# Patient Record
Sex: FEMALE | Race: BLACK OR AFRICAN AMERICAN | NOT HISPANIC OR LATINO | URBAN - METROPOLITAN AREA
[De-identification: names, ages, dates, MRNs, and addresses within clinical notes are randomized per-mention and may not be internally consistent; named-entity substitution may affect disease eponyms.]

---

## 2018-08-17 DIAGNOSIS — Z99.2 END STAGE RENAL FAILURE ON DIALYSIS: Primary | ICD-10-CM

## 2018-08-17 DIAGNOSIS — N18.6 END STAGE RENAL FAILURE ON DIALYSIS: Primary | ICD-10-CM

## 2018-08-20 ENCOUNTER — HOSPITAL ENCOUNTER (OUTPATIENT)
Facility: HOSPITAL | Age: 83
Discharge: HOME | End: 2018-08-20
Attending: SURGERY | Admitting: SURGERY
Payer: MEDICARE

## 2018-08-20 ENCOUNTER — ANESTHESIA EVENT (OUTPATIENT)
Dept: SURGERY | Facility: HOSPITAL | Age: 83
End: 2018-08-20
Payer: MEDICARE

## 2018-08-20 ENCOUNTER — HOSPITAL ENCOUNTER (OUTPATIENT)
Dept: VASCULAR SURGERY | Facility: CLINIC | Age: 83
Discharge: HOME | End: 2018-08-20
Attending: SURGERY
Payer: MEDICARE

## 2018-08-20 ENCOUNTER — ANESTHESIA (OUTPATIENT)
Dept: SURGERY | Facility: HOSPITAL | Age: 83
End: 2018-08-20
Payer: MEDICARE

## 2018-08-20 ENCOUNTER — OFFICE VISIT (OUTPATIENT)
Dept: VASCULAR SURGERY | Facility: CLINIC | Age: 83
End: 2018-08-20
Payer: MEDICARE

## 2018-08-20 VITALS
TEMPERATURE: 98 F | BODY MASS INDEX: 29.99 KG/M2 | WEIGHT: 180 LBS | OXYGEN SATURATION: 99 % | RESPIRATION RATE: 16 BRPM | DIASTOLIC BLOOD PRESSURE: 55 MMHG | SYSTOLIC BLOOD PRESSURE: 168 MMHG | HEART RATE: 78 BPM | HEIGHT: 65 IN

## 2018-08-20 VITALS — HEART RATE: 71 BPM | DIASTOLIC BLOOD PRESSURE: 56 MMHG | TEMPERATURE: 98 F | SYSTOLIC BLOOD PRESSURE: 165 MMHG

## 2018-08-20 DIAGNOSIS — N18.6 ESRD (END STAGE RENAL DISEASE) ON DIALYSIS: Primary | ICD-10-CM

## 2018-08-20 DIAGNOSIS — T82.510A PSEUDOANEURYSM OF ARTERIOVENOUS DIALYSIS FISTULA, INITIAL ENCOUNTER: ICD-10-CM

## 2018-08-20 DIAGNOSIS — N18.6 ESRD (END STAGE RENAL DISEASE) ON DIALYSIS: ICD-10-CM

## 2018-08-20 DIAGNOSIS — Z99.2 ESRD (END STAGE RENAL DISEASE) ON DIALYSIS: Primary | ICD-10-CM

## 2018-08-20 DIAGNOSIS — N18.6 END STAGE RENAL FAILURE ON DIALYSIS: ICD-10-CM

## 2018-08-20 DIAGNOSIS — Z99.2 END STAGE RENAL FAILURE ON DIALYSIS: ICD-10-CM

## 2018-08-20 DIAGNOSIS — Z01.818 PRE-OP EVALUATION: Primary | ICD-10-CM

## 2018-08-20 DIAGNOSIS — T82.510A PSEUDOANEURYSM OF ARTERIOVENOUS DIALYSIS FISTULA, INITIAL ENCOUNTER: Primary | ICD-10-CM

## 2018-08-20 DIAGNOSIS — Z99.2 ESRD (END STAGE RENAL DISEASE) ON DIALYSIS: ICD-10-CM

## 2018-08-20 DIAGNOSIS — R07.9 CHEST PAIN: ICD-10-CM

## 2018-08-20 LAB
ANION GAP SERPL CALC-SCNC: 13 MMOL/L
BUN SERPL-MCNC: 39 MG/DL
CALCIUM SERPL-MCNC: 8.2 MG/DL
CHLORIDE SERPL-SCNC: 111 MMOL/L
CO2 SERPL-SCNC: 18 MMOL/L
CREAT SERPL-MCNC: 7.3 MG/DL
EST. GFR  (AFRICAN AMERICAN): 5.1 ML/MIN/1.73 M^2
EST. GFR  (NON AFRICAN AMERICAN): 4.5 ML/MIN/1.73 M^2
GLUCOSE SERPL-MCNC: 77 MG/DL
POCT GLUCOSE: 83 MG/DL (ref 70–110)
POTASSIUM SERPL-SCNC: 5 MMOL/L
SODIUM SERPL-SCNC: 142 MMOL/L

## 2018-08-20 PROCEDURE — 82962 GLUCOSE BLOOD TEST: CPT | Performed by: SURGERY

## 2018-08-20 PROCEDURE — 25000003 PHARM REV CODE 250: Performed by: STUDENT IN AN ORGANIZED HEALTH CARE EDUCATION/TRAINING PROGRAM

## 2018-08-20 PROCEDURE — 93005 ELECTROCARDIOGRAM TRACING: CPT

## 2018-08-20 PROCEDURE — 99205 OFFICE O/P NEW HI 60 MIN: CPT | Mod: 57,S$PBB,, | Performed by: SURGERY

## 2018-08-20 PROCEDURE — 63600175 PHARM REV CODE 636 W HCPCS: Performed by: SURGERY

## 2018-08-20 PROCEDURE — 25000003 PHARM REV CODE 250: Performed by: NURSE ANESTHETIST, CERTIFIED REGISTERED

## 2018-08-20 PROCEDURE — 93990 DOPPLER FLOW TESTING: CPT | Mod: 26,GW,S$PBB, | Performed by: SURGERY

## 2018-08-20 PROCEDURE — 25000003 PHARM REV CODE 250: Performed by: SURGERY

## 2018-08-20 PROCEDURE — 77001 FLUOROGUIDE FOR VEIN DEVICE: CPT | Mod: 26,GW,GC, | Performed by: SURGERY

## 2018-08-20 PROCEDURE — 36558 INSERT TUNNELED CV CATH: CPT | Mod: 51,GW,RT,GC | Performed by: SURGERY

## 2018-08-20 PROCEDURE — 93990 DOPPLER FLOW TESTING: CPT | Mod: PBBFAC | Performed by: SURGERY

## 2018-08-20 PROCEDURE — 36000706: Performed by: SURGERY

## 2018-08-20 PROCEDURE — 80048 BASIC METABOLIC PNL TOTAL CA: CPT

## 2018-08-20 PROCEDURE — 99999 PR PBB SHADOW E&M-EST. PATIENT-LVL III: CPT | Mod: PBBFAC,,, | Performed by: SURGERY

## 2018-08-20 PROCEDURE — 99213 OFFICE O/P EST LOW 20 MIN: CPT | Mod: PBBFAC,25 | Performed by: SURGERY

## 2018-08-20 PROCEDURE — 93010 ELECTROCARDIOGRAM REPORT: CPT | Mod: GW,,, | Performed by: INTERNAL MEDICINE

## 2018-08-20 PROCEDURE — D9220A PRA ANESTHESIA: Mod: GW,CRNA,, | Performed by: NURSE ANESTHETIST, CERTIFIED REGISTERED

## 2018-08-20 PROCEDURE — C1894 INTRO/SHEATH, NON-LASER: HCPCS | Performed by: SURGERY

## 2018-08-20 PROCEDURE — D9220A PRA ANESTHESIA: Mod: GW,ANES,, | Performed by: ANESTHESIOLOGY

## 2018-08-20 PROCEDURE — 37607 LIG/BANDING ANGIOACS AV FSTL: CPT | Mod: GW,GC,, | Performed by: SURGERY

## 2018-08-20 PROCEDURE — 37000008 HC ANESTHESIA 1ST 15 MINUTES: Performed by: SURGERY

## 2018-08-20 PROCEDURE — 63600175 PHARM REV CODE 636 W HCPCS: Performed by: NURSE ANESTHETIST, CERTIFIED REGISTERED

## 2018-08-20 PROCEDURE — 71000015 HC POSTOP RECOV 1ST HR: Performed by: SURGERY

## 2018-08-20 PROCEDURE — 37000009 HC ANESTHESIA EA ADD 15 MINS: Performed by: SURGERY

## 2018-08-20 PROCEDURE — 71000016 HC POSTOP RECOV ADDL HR: Performed by: SURGERY

## 2018-08-20 PROCEDURE — 36000707: Performed by: SURGERY

## 2018-08-20 RX ORDER — DIPHENHYDRAMINE HYDROCHLORIDE 50 MG/ML
INJECTION INTRAMUSCULAR; INTRAVENOUS
Status: DISCONTINUED | OUTPATIENT
Start: 2018-08-20 | End: 2018-08-20

## 2018-08-20 RX ORDER — HYDROMORPHONE HYDROCHLORIDE 1 MG/ML
0.2 INJECTION, SOLUTION INTRAMUSCULAR; INTRAVENOUS; SUBCUTANEOUS EVERY 5 MIN PRN
Status: DISCONTINUED | OUTPATIENT
Start: 2018-08-20 | End: 2018-08-20 | Stop reason: HOSPADM

## 2018-08-20 RX ORDER — LIDOCAINE HYDROCHLORIDE 10 MG/ML
INJECTION INFILTRATION; PERINEURAL
Status: DISCONTINUED | OUTPATIENT
Start: 2018-08-20 | End: 2018-08-20 | Stop reason: HOSPADM

## 2018-08-20 RX ORDER — FENTANYL CITRATE 50 UG/ML
INJECTION, SOLUTION INTRAMUSCULAR; INTRAVENOUS
Status: DISCONTINUED | OUTPATIENT
Start: 2018-08-20 | End: 2018-08-20

## 2018-08-20 RX ORDER — LIDOCAINE HYDROCHLORIDE 10 MG/ML
1 INJECTION, SOLUTION EPIDURAL; INFILTRATION; INTRACAUDAL; PERINEURAL ONCE
Status: CANCELLED | OUTPATIENT
Start: 2018-08-20 | End: 2018-08-20

## 2018-08-20 RX ORDER — PROPOFOL 10 MG/ML
VIAL (ML) INTRAVENOUS CONTINUOUS PRN
Status: DISCONTINUED | OUTPATIENT
Start: 2018-08-20 | End: 2018-08-20

## 2018-08-20 RX ORDER — MEMANTINE HYDROCHLORIDE 28 MG/1
28 CAPSULE, EXTENDED RELEASE ORAL
COMMUNITY
Start: 2018-07-11

## 2018-08-20 RX ORDER — SODIUM CHLORIDE 9 MG/ML
INJECTION, SOLUTION INTRAVENOUS CONTINUOUS PRN
Status: DISCONTINUED | OUTPATIENT
Start: 2018-08-20 | End: 2018-08-20

## 2018-08-20 RX ORDER — MUPIROCIN 20 MG/G
OINTMENT TOPICAL
Status: CANCELLED | OUTPATIENT
Start: 2018-08-20

## 2018-08-20 RX ORDER — CLOPIDOGREL BISULFATE 75 MG/1
75 TABLET ORAL DAILY
COMMUNITY

## 2018-08-20 RX ORDER — ONDANSETRON 2 MG/ML
INJECTION INTRAMUSCULAR; INTRAVENOUS
Status: DISCONTINUED | OUTPATIENT
Start: 2018-08-20 | End: 2018-08-20

## 2018-08-20 RX ORDER — SEVELAMER CARBONATE 800 MG/1
800 TABLET, FILM COATED ORAL
COMMUNITY

## 2018-08-20 RX ORDER — HEPARIN SODIUM 1000 [USP'U]/ML
INJECTION, SOLUTION INTRAVENOUS; SUBCUTANEOUS
Status: DISCONTINUED | OUTPATIENT
Start: 2018-08-20 | End: 2018-08-20 | Stop reason: HOSPADM

## 2018-08-20 RX ORDER — LIDOCAINE HYDROCHLORIDE 10 MG/ML
1 INJECTION, SOLUTION EPIDURAL; INFILTRATION; INTRACAUDAL; PERINEURAL ONCE
Status: COMPLETED | OUTPATIENT
Start: 2018-08-20 | End: 2018-08-20

## 2018-08-20 RX ORDER — PROPOFOL 10 MG/ML
VIAL (ML) INTRAVENOUS
Status: DISCONTINUED | OUTPATIENT
Start: 2018-08-20 | End: 2018-08-20

## 2018-08-20 RX ORDER — HYDRALAZINE HYDROCHLORIDE 50 MG/1
50 TABLET, FILM COATED ORAL
COMMUNITY

## 2018-08-20 RX ORDER — ASPIRIN 81 MG/1
81 TABLET ORAL DAILY
COMMUNITY

## 2018-08-20 RX ORDER — QUETIAPINE FUMARATE 50 MG/1
50 TABLET, FILM COATED ORAL
COMMUNITY

## 2018-08-20 RX ORDER — LOSARTAN POTASSIUM 100 MG/1
TABLET ORAL
COMMUNITY
Start: 2018-06-22

## 2018-08-20 RX ORDER — DONEPEZIL HYDROCHLORIDE 10 MG/1
10 TABLET, FILM COATED ORAL
COMMUNITY

## 2018-08-20 RX ORDER — TRAZODONE HYDROCHLORIDE 100 MG/1
100 TABLET ORAL
COMMUNITY
Start: 2018-07-11

## 2018-08-20 RX ORDER — SODIUM CHLORIDE 0.9 % (FLUSH) 0.9 %
3 SYRINGE (ML) INJECTION
Status: DISCONTINUED | OUTPATIENT
Start: 2018-08-20 | End: 2018-08-20 | Stop reason: HOSPADM

## 2018-08-20 RX ORDER — PANTOPRAZOLE SODIUM 40 MG/1
40 TABLET, DELAYED RELEASE ORAL
COMMUNITY
Start: 2017-04-11

## 2018-08-20 RX ORDER — MIDAZOLAM HYDROCHLORIDE 1 MG/ML
INJECTION, SOLUTION INTRAMUSCULAR; INTRAVENOUS
Status: DISCONTINUED | OUTPATIENT
Start: 2018-08-20 | End: 2018-08-20

## 2018-08-20 RX ORDER — LIDOCAINE HCL/PF 100 MG/5ML
SYRINGE (ML) INTRAVENOUS
Status: DISCONTINUED | OUTPATIENT
Start: 2018-08-20 | End: 2018-08-20

## 2018-08-20 RX ORDER — HYDROCODONE BITARTRATE AND ACETAMINOPHEN 5; 325 MG/1; MG/1
1 TABLET ORAL EVERY 6 HOURS PRN
Qty: 10 TABLET | Refills: 0 | Status: SHIPPED | OUTPATIENT
Start: 2018-08-20

## 2018-08-20 RX ORDER — MUPIROCIN 20 MG/G
OINTMENT TOPICAL
Status: DISCONTINUED | OUTPATIENT
Start: 2018-08-20 | End: 2018-08-20 | Stop reason: HOSPADM

## 2018-08-20 RX ORDER — HYDROCODONE BITARTRATE AND ACETAMINOPHEN 5; 325 MG/1; MG/1
1 TABLET ORAL ONCE
Status: COMPLETED | OUTPATIENT
Start: 2018-08-20 | End: 2018-08-20

## 2018-08-20 RX ADMIN — DIPHENHYDRAMINE HYDROCHLORIDE 10 MG: 50 INJECTION, SOLUTION INTRAMUSCULAR; INTRAVENOUS at 03:08

## 2018-08-20 RX ADMIN — HYDROCODONE BITARTRATE AND ACETAMINOPHEN 1 TABLET: 5; 325 TABLET ORAL at 07:08

## 2018-08-20 RX ADMIN — MUPIROCIN: 20 OINTMENT TOPICAL at 02:08

## 2018-08-20 RX ADMIN — LIDOCAINE HYDROCHLORIDE 10 MG: 10 INJECTION, SOLUTION EPIDURAL; INFILTRATION; INTRACAUDAL; PERINEURAL at 01:08

## 2018-08-20 RX ADMIN — ONDANSETRON 4 MG: 2 INJECTION INTRAMUSCULAR; INTRAVENOUS at 03:08

## 2018-08-20 RX ADMIN — FENTANYL CITRATE 25 MCG: 50 INJECTION, SOLUTION INTRAMUSCULAR; INTRAVENOUS at 03:08

## 2018-08-20 RX ADMIN — MIDAZOLAM HYDROCHLORIDE 0.5 MG: 1 INJECTION, SOLUTION INTRAMUSCULAR; INTRAVENOUS at 04:08

## 2018-08-20 RX ADMIN — MIDAZOLAM HYDROCHLORIDE 1 MG: 1 INJECTION, SOLUTION INTRAMUSCULAR; INTRAVENOUS at 03:08

## 2018-08-20 RX ADMIN — PROPOFOL 20 MG: 10 INJECTION, EMULSION INTRAVENOUS at 03:08

## 2018-08-20 RX ADMIN — LIDOCAINE HYDROCHLORIDE 50 MG: 20 INJECTION, SOLUTION INTRAVENOUS at 03:08

## 2018-08-20 RX ADMIN — PROPOFOL 20 MCG/KG/MIN: 10 INJECTION, EMULSION INTRAVENOUS at 03:08

## 2018-08-20 RX ADMIN — DEXTROSE 2 G: 50 INJECTION, SOLUTION INTRAVENOUS at 03:08

## 2018-08-20 RX ADMIN — SODIUM CHLORIDE: 0.9 INJECTION, SOLUTION INTRAVENOUS at 02:08

## 2018-08-20 NOTE — INTERVAL H&P NOTE
The patient has been examined and the H&P has been reviewed:    I concur with the findings and no changes have occurred since H&P was written.    Anesthesia/Surgery risks, benefits and alternative options discussed and understood by patient/family.          Active Hospital Problems    Diagnosis  POA    ESRD (end stage renal disease) on dialysis [N18.6, Z99.2]  Not Applicable      Resolved Hospital Problems   No resolved problems to display.

## 2018-08-20 NOTE — OP NOTE
DATE: 8/20/2018    PREOPERATIVE DIAGNOSIS:  1. Renal failure on Dialysis       2. Bleeding, Ulcerated Pseudoaneurysm Right arm AV fistula    POSTOPERATIVE DIAGNOSIS:   Same    PROCEDURE PERFORMED:   1)Right internal jugular tunneled hemodialysis catheter placement with fluoro, 19cm, 14.5Fr Hemosplit  2)Right upper extremity AV fistula ligation    ATTENDING SURGEON: Odilia Costa M.D.     HOUSESTAFF SURGEON: Aravind Borjas M.D. (RES)     ANESTHESIA: Monitored anesthesia care plus local.     ESTIMATED BLOOD LOSS: 10 mL     FINDINGS: A 14.5-Citizen of [place] Permacath placed with tip at junction of superior vena cava and right atrium.     SPECIMEN: None.     DRAINS: None.     COMPLICATIONS: None.     INDICATIONS: Patient is a 89 y/o female with ESRD on HD  Who presented to [facility] with an ulcerated, bleeding right upper extremity AV fistula.  We recommended that this AV fistula be ligated and a right internal jugular tunneled dialysis catheter be placed for dialysis access. The patient did sign informed consent and expressed understanding of the risks and benefits of surgery.     OPERATIVE PROCEDURE: The patient was identified in the pre op area and transported to the Operating Room. She was placed supine on the operating table and padded appropriately. Monitors were applied. Monitored anesthesia care was initiated. The right neck and chest as well as the right arm were prepped and draped in the standard sterile surgical fashion. A timeout was performed and all team members present agreed this was the correct procedure on the correct patient. We also confirmed administration of appropriate preoperative antibiotics.     Using ultrasound we visualized the right internal jugular vein. After administering local anesthesia, the vein was cannulated with a hollow bore needle. A guidewire was placed and confirmed to be in correct position using fluoroscopy. A small skin incision was made around the guidewire. An  appropriately sized catheter was chosen. A counter incision just caudal to the right clavicle was made after administering additional lidocaine. The catheter was loaded onto the tunneling device and tunneled from the chest incision to the neck incision. Under fluoroscopic guidance, the split sheath and dilator were placed over the guidewire, and the guidewire and dilator were then removed. The catheter was placed down the split sheath and the sheath was split and peeled away. Fluoroscopy confirmed appropriate position of the catheter, which aspirated and flushed easily. Heparinized saline was instilled in the catheter. The catheter was secured at it's exit site using 2-0 Prolene suture. The skin incision on the neck was closed with subcuticular 4-0 Monocryl. A sterile dressing was applied.    We then turned our attention to the right upper extremity AV fistula. A 2 cm incision was made over the previous scar. The incision was carried down through the subcutaneous tissues using Bovie electrocautery and the fistula was isolated and divided using two 0 silk sutures. Hemostasis was achieved and the wound was closed in layers using interrupted 3-0 Vicryl deep dermal sutures and a running 4-0 Monocryl subcuticular stitch. Steri Strips and a sterile dressing were applied.     The patient was transported to recovery in stable condition. All sponge, instrument and needle counts were correct at the end of the procedure. Dr. Costa was present and scrubbed for the entire case.    Aravind Borjas MD  General Surgery PGY-3  Pager: [phone]

## 2018-08-20 NOTE — PROGRESS NOTES
Patient ID: Anjana Mccauley is a 89 y.o. female.    I. HISTORY     Chief Complaint: Consult      HPI   MsByron Mccauley is a 89 yo female who presents today for a second opinion regarding her fistula. Patient and her family note she has been on HD since 2011 and has had two upper extremity fistulas created. One fistula in the left arm which occluded, the second and most recent in her right arm. Patient's family notes that the fistula has been functioning well without problems with access/outflow, but over the past few weeks they have noticed increased bleeding following access and in between access. They note that the site will ooze blood such that it will soak the dressings. She has been into the ED several times, most recently requiring stitch placement to stop the bleeding. Her family notes that while she was last in the ED she required a blood transfusion but was never hemodynamically unstable. She feels that her fistula has become painful since it has begun to bleed. Upon exam patient has an ulceration to the area of fistula access; 1 x 1.5 cm in diameter; patient and family note it has been present since about May but has gotten worse over time. She generally receives medical treatment for her fistula in [place], but when they presented to the facility for evaluation they suggested her only option would be to ligate the fistula. They therefore present today for a second opinion before proceeding forward.     Review of Systems   Constitution: Positive for weakness. Negative for chills and fever.        Patient generally deconditioned following injury this spring; broke both femurs   Cardiovascular: Negative for chest pain, irregular heartbeat and palpitations.        Fistula present to right upper extremity; palpable thrill present.   Respiratory: Negative for cough and shortness of breath.    Skin:        Ulceration present over most superior access site of fistula on right arm, 1 x 1.5 cm in diameter. Oozing  serosanguinous fluid.        II. PHYSICAL EXAM   Left Leg BP: 165/56 (08/20/18 0817)  Pulse: 71  Temp: 98 °F (36.7 °C)  There is no height or weight on file to calculate BMI.      Physical Exam   Constitutional: She is oriented to person, place, and time. She appears well-developed and well-nourished. No distress.   HENT:   Head: Normocephalic and atraumatic.   Eyes: Conjunctivae are normal. Pupils are equal, round, and reactive to light.   Neck: Normal range of motion. Neck supple.   Cardiovascular: Normal rate and regular rhythm.   Pulmonary/Chest: Effort normal. No respiratory distress.   Abdominal: Soft. There is no tenderness.   Musculoskeletal: Normal range of motion. She exhibits no edema.   Fistula present with palpable thrill to right upper extremity. Ulceration to superior access site of fistula; 1 x 1.5 cm in diameter. Ulceration oozing a serosanguinous fluid.    Neurological: She is alert and oriented to person, place, and time.   Skin: Skin is warm. No erythema.   Psychiatric: She has a normal mood and affect. Her behavior is normal. Judgment normal.       III. ASSESSMENT & PLAN (MEDICAL DECISION MAKING)     1. Pseudoaneurysm of arteriovenous dialysis fistula, initial encounter    2. ESRD (end stage renal disease) on dialysis        Imaging Results:  Hd duplex - no stenosis, flow vol 2373    Assessment/Diagnosis and Plan:  Anjana Mccauley is a 89 y.o. female with ESRD, on HD via a left BC AVF    Vein has a 1cm ulceration that has bled multiple times over the past week even requiring a blood transfusion.  She needs her AVF ligated and a catheter placed. Recommend that be done today with discharge home and HD at her home unit tomorrow.    I have explained the risks, benefits, medical treatments, and alternatives of the procedure in detail.  The patient voices understanding and all questions have been answered.  The patient agrees to proceed as planned.  >60 minute visit with more than half in  counseling    Odilia Costa MD FACS [facility]  Vascular & Endovascular Surgery

## 2018-08-20 NOTE — TRANSFER OF CARE
"Anesthesia Transfer of Care Note    Patient: Anjana Mccauley    Procedure(s) Performed: Procedure(s) (LRB):  LIGATION, AV FISTULA Placement HD Catheter with Fluoro (Right)    Patient location: PACU    Anesthesia Type: general    Transport from OR: Transported from OR on room air with adequate spontaneous ventilation    Post pain: adequate analgesia    Post assessment: no apparent anesthetic complications and tolerated procedure well    Post vital signs: stable    Level of consciousness: confused    Nausea/Vomiting: no nausea/vomiting    Complications: none    Transfer of care protocol was followed      Last vitals:   Visit Vitals  BP (!) 146/46 (BP Location: Right leg, Patient Position: Lying)   Pulse 70   Temp 37 °C (98.6 °F) (Oral)   Resp 18   Ht 5' 5" (1.651 m)   Wt 81.6 kg (180 lb)   SpO2 100%   Breastfeeding? No   BMI 29.95 kg/m²     "

## 2018-08-20 NOTE — BRIEF OP NOTE
[facility]  Brief Operative Note     SUMMARY     Surgery Date: 8/20/2018     Surgeon(s) and Role:     * Odilia Costa MD - Primary     * Aravind Borjas MD - Resident - Assisting      Pre-op Diagnosis:  ESRD (end stage renal disease) on dialysis [N18.6, Z99.2]  Pseudoaneurysm of arteriovenous dialysis fistula, initial encounter [T82.494N]    Post-op Diagnosis:  Post-Op Diagnosis Codes:     * ESRD (end stage renal disease) on dialysis [N18.6, Z99.2]     * Pseudoaneurysm of arteriovenous dialysis fistula, initial encounter [T82.156G]    Procedure(s) (LRB):  LIGATION, AV FISTULA Placement HD Catheter with Fluoro (Right)    Anesthesia: Local MAC    Description of the findings of the procedure: Left AV fistula ligation; Right internal jugular Permacath placement.     Findings/Key Components: Left AV fistula ligation; Right internal jugular Permacath placement.       Estimated Blood Loss: Minimal          Specimens:   Specimen (12h ago, onward)    None          Discharge Note    SUMMARY     Admit Date: 8/20/2018    Discharge Date and Time:  08/20/2018 4:49 PM    Hospital Course (synopsis of major diagnoses, care, treatment, and services provided during the course of the hospital stay): The patient presented for the above outpatient procedure, which she tolerated without immediate issue. She was taken to recovery postoperatively and deemed safe for discharge home the same day.      Final Diagnosis: Post-Op Diagnosis Codes:     * ESRD (end stage renal disease) on dialysis [N18.6, Z99.2]     * Pseudoaneurysm of arteriovenous dialysis fistula, initial encounter [T82.406I]    Disposition: Home or Self Care    Follow Up/Patient Instructions:     Medications:  Reconciled Home Medications:      Medication List      START taking these medications    HYDROcodone-acetaminophen 5-325 mg per tablet  Commonly known as:  NORCO  Take 1 tablet by mouth every 6 (six) hours as needed for Pain.        CONTINUE  taking these medications    aspirin 81 MG EC tablet  Commonly known as:  ECOTRIN  Take 81 mg by mouth once daily.     clopidogrel 75 mg tablet  Commonly known as:  PLAVIX  Take 75 mg by mouth once daily.     donepezil 10 MG tablet  Commonly known as:  ARICEPT  Take 10 mg by mouth.     hydrALAZINE 50 MG tablet  Commonly known as:  APRESOLINE  Take 50 mg by mouth.     losartan 100 MG tablet  Commonly known as:  COZAAR     memantine 28 mg Cspx  Take 28 mg by mouth.     pantoprazole 40 MG tablet  Commonly known as:  PROTONIX  Take 40 mg by mouth.     QUEtiapine 50 MG tablet  Commonly known as:  SEROQUEL  Take 50 mg by mouth.     sevelamer carbonate 800 mg Tab  Commonly known as:  RENVELA  Take 800 mg by mouth.     traZODone 100 MG tablet  Commonly known as:  DESYREL  Take 100 mg by mouth.          Discharge Procedure Orders   Notify your health care provider if you experience any of the following:  temperature >100.4     Notify your health care provider if you experience any of the following:  persistent nausea and vomiting or diarrhea     Notify your health care provider if you experience any of the following:  severe uncontrolled pain     Notify your health care provider if you experience any of the following:  redness, tenderness, or signs of infection (pain, swelling, redness, odor or green/yellow discharge around incision site)     Remove dressing in 48 hours   Scheduling Instructions: May remove outer dressing on arm in 48 hours. Underlying Steri Strips will remain in place for 7-10 days and fall off on their own. Leave dressing on left chest dialysis catheter. The dialysis center will change dressing appropriately.     Activity as tolerated

## 2018-08-20 NOTE — H&P (VIEW-ONLY)
Patient ID: Anjana Mccauley is a 89 y.o. female.    I. HISTORY     Chief Complaint: Consult      HPI   MsByron Mccauley is a 89 yo female who presents today for a second opinion regarding her fistula. Patient and her family note she has been on HD since 2011 and has had two upper extremity fistulas created. One fistula in the left arm which occluded, the second and most recent in her right arm. Patient's family notes that the fistula has been functioning well without problems with access/outflow, but over the past few weeks they have noticed increased bleeding following access and in between access. They note that the site will ooze blood such that it will soak the dressings. She has been into the ED several times, most recently requiring stitch placement to stop the bleeding. Her family notes that while she was last in the ED she required a blood transfusion but was never hemodynamically unstable. She feels that her fistula has become painful since it has begun to bleed. Upon exam patient has an ulceration to the area of fistula access; 1 x 1.5 cm in diameter; patient and family note it has been present since about May but has gotten worse over time. She generally receives medical treatment for her fistula in [place], but when they presented to the facility for evaluation they suggested her only option would be to ligate the fistula. They therefore present today for a second opinion before proceeding forward.     Review of Systems   Constitution: Positive for weakness. Negative for chills and fever.        Patient generally deconditioned following injury this spring; broke both femurs   Cardiovascular: Negative for chest pain, irregular heartbeat and palpitations.        Fistula present to right upper extremity; palpable thrill present.   Respiratory: Negative for cough and shortness of breath.    Skin:        Ulceration present over most superior access site of fistula on right arm, 1 x 1.5 cm in diameter. Oozing  serosanguinous fluid.        II. PHYSICAL EXAM   Left Leg BP: 165/56 (08/20/18 0817)  Pulse: 71  Temp: 98 °F (36.7 °C)  There is no height or weight on file to calculate BMI.      Physical Exam   Constitutional: She is oriented to person, place, and time. She appears well-developed and well-nourished. No distress.   HENT:   Head: Normocephalic and atraumatic.   Eyes: Conjunctivae are normal. Pupils are equal, round, and reactive to light.   Neck: Normal range of motion. Neck supple.   Cardiovascular: Normal rate and regular rhythm.   Pulmonary/Chest: Effort normal. No respiratory distress.   Abdominal: Soft. There is no tenderness.   Musculoskeletal: Normal range of motion. She exhibits no edema.   Fistula present with palpable thrill to right upper extremity. Ulceration to superior access site of fistula; 1 x 1.5 cm in diameter. Ulceration oozing a serosanguinous fluid.    Neurological: She is alert and oriented to person, place, and time.   Skin: Skin is warm. No erythema.   Psychiatric: She has a normal mood and affect. Her behavior is normal. Judgment normal.       III. ASSESSMENT & PLAN (MEDICAL DECISION MAKING)     1. Pseudoaneurysm of arteriovenous dialysis fistula, initial encounter    2. ESRD (end stage renal disease) on dialysis        Imaging Results:  Hd duplex - no stenosis, flow vol 2373    Assessment/Diagnosis and Plan:  Anjana Mccauley is a 89 y.o. female with ESRD, on HD via a left BC AVF    Vein has a 1cm ulceration that has bled multiple times over the past week even requiring a blood transfusion.  She needs her AVF ligated and a catheter placed. Recommend that be done today with discharge home and HD at her home unit tomorrow.    I have explained the risks, benefits, medical treatments, and alternatives of the procedure in detail.  The patient voices understanding and all questions have been answered.  The patient agrees to proceed as planned.  >60 minute visit with more than half in  counseling    Odilia Costa MD FACS [facility]  Vascular & Endovascular Surgery

## 2018-08-20 NOTE — PROGRESS NOTES
-fistula present for at least 5 years; bacilobrachial transpotisiont in 2015, prior fisulta a few years before...Also, has had stent placed downstream of anastomoses  -spontaneous bleeding noticed after dialysis over past few weeks; had some oozing around site following an infection about a year ago  -with bledding will soak through dressing; has required 3 visits to ED...required blood transfusions (has never been HD unstable due to bleeding, but lost enough to require blood transfusion)  -bleeding usually resolves before she can get to ED  -stictch placed on Sunday, stitch placed oin FEbruray as well  -[facility] for dialysis; suggested fistula would need to be ligated to stop bleeding, want second opinon  -have been able to access fistula well, no outflow problems  -been complaining of pain with bleeding    Physical exam;

## 2018-08-20 NOTE — ANESTHESIA PREPROCEDURE EVALUATION
08/20/2018  Anjana Mccauley is a 89 y.o., female.    Anesthesia Evaluation    I have reviewed the Patient Summary Reports.    I have reviewed the Nursing Notes.   I have reviewed the Medications.     Review of Systems  Anesthesia Hx:  No problems with previous Anesthesia  History of prior surgery of interest to airway management or planning: Previous anesthesia: General Denies Family Hx of Anesthesia complications.   Denies Personal Hx of Anesthesia complications.   Cardiovascular:   Exercise tolerance: poor Hypertension CHF    Renal/:   Chronic Renal Disease, ESRD, Dialysis    Neurological:   CVA, residual symptoms    Endocrine:   Diabetes    Psych:   Psychiatric History          Physical Exam  General:  Well nourished    Airway/Jaw/Neck:  Airway Findings: Mouth Opening: Normal Tongue: Normal  General Airway Assessment: Adult  Mallampati: II  Improves to II with phonation.  TM Distance: Normal, at least 6 cm  Jaw/Neck Findings:  Neck ROM: Normal ROM      Dental:  Dental Findings: In tact   Chest/Lungs:  Chest/Lungs Findings: Clear to auscultation, Normal Respiratory Rate     Heart/Vascular:  Heart Findings: Rate: Normal  Rhythm: Regular Rhythm  Sounds: Normal        Mental Status:  Mental Status Findings:  Cooperative         Anesthesia Plan  Type of Anesthesia, risks & benefits discussed:  Anesthesia Type:  general, MAC  Patient's Preference:   Intra-op Monitoring Plan: standard ASA monitors  Intra-op Monitoring Plan Comments:   Post Op Pain Control Plan: multimodal analgesia  Post Op Pain Control Plan Comments:   Induction:   IV  Beta Blocker:         Informed Consent: Patient understands risks and agrees with Anesthesia plan.  Questions answered. Anesthesia consent signed with patient representative.  ASA Score: 4     Day of Surgery Review of History & Physical:    H&P update referred to the surgeon.          Ready For Surgery From Anesthesia Perspective.

## 2018-08-21 NOTE — PROGRESS NOTES
Dr. Murillo here to hold pressure to steri-strip to neck. Bleeding stopped after about 20 minutes of pressure and new steri-strip applied.

## 2018-08-21 NOTE — PLAN OF CARE
Problem: Patient Care Overview  Goal: Plan of Care Review  Outcome: Outcome(s) achieved Date Met: 08/20/18  Awake and alert. VSS. Denies  Nausea. Appears comfortable. CXR reviewed by MD and states OK for discharge. Vascular has reviewed BMP and EKG and states that patient is ready for discharge. Tolerating liquids well. DC instructions given to patient and family and they verbalize understanding.

## 2018-08-24 NOTE — ANESTHESIA POSTPROCEDURE EVALUATION
"Anesthesia Post Evaluation    Patient: Anjana Mccauley    Procedure(s) Performed: Procedure(s) (LRB):  LIGATION, AV FISTULA Placement HD Catheter with Fluoro (Right)    Final Anesthesia Type: general  [facility]  Patient participation: Yes- Able to Participate  Level of consciousness: awake and alert  Post-procedure vital signs: reviewed and stable  Pain management: adequate  Airway patency: patent  PONV status at discharge: No PONV  Anesthetic complications: no      Cardiovascular status: blood pressure returned to baseline  Respiratory status: unassisted  Hydration status: euvolemic  Follow-up not needed.        Visit Vitals  BP (!) 168/55   Pulse 78   Temp 36.7 °C (98.1 °F) (Temporal)   Resp 16   Ht 5' 5" (1.651 m)   Wt 81.6 kg (180 lb)   SpO2 99%   Breastfeeding? No   BMI 29.95 kg/m²       Pain/Elizabeth Score: No Data Recorded      "
Single

## 2018-09-25 ENCOUNTER — TELEPHONE (OUTPATIENT)
Dept: VASCULAR SURGERY | Facility: CLINIC | Age: 83
End: 2018-09-25

## 2018-09-25 DIAGNOSIS — N18.6 ESRD (END STAGE RENAL DISEASE) ON DIALYSIS: Primary | ICD-10-CM

## 2018-09-25 DIAGNOSIS — Z99.2 ESRD (END STAGE RENAL DISEASE) ON DIALYSIS: Primary | ICD-10-CM

## 2018-09-25 NOTE — TELEPHONE ENCOUNTER
----- Message from Tati Hercules sent at 9/25/2018 10:06 AM CDT -----  Contact: Ms Deluca/   [facility]   [phone]  Ms Deluca states patient still experiencing problem with arm need appointment for either Tuesday or Thursday next week.   Please call  Ms Avendaño patient daughter at [phone] if no answer.   Please call  Ms VivarSandrine/   [facility]   [phone]

## (undated) DEVICE — DRESSING TELFA STRL 4X3 LF

## (undated) DEVICE — TRAY MINOR GEN SURG

## (undated) DEVICE — SUT MCRYL PLUS 4-0 PS2 27IN

## (undated) DEVICE — COVER LIGHT HANDLE 80/CA

## (undated) DEVICE — GOWN SURGICAL X-LARGE

## (undated) DEVICE — KIT INTRO MICRO NIT VSI 4FR

## (undated) DEVICE — SUT 4-0 12-18IN SILK BLACK

## (undated) DEVICE — SUT MONOCRYL 3-0 SH U/D

## (undated) DEVICE — APPLICATOR CHLORAPREP ORN 26ML

## (undated) DEVICE — STOCKINET 4INX48

## (undated) DEVICE — SUT SILK 2-0 SH 18IN BLACK

## (undated) DEVICE — ADHESIVE DERMABOND ADVANCED

## (undated) DEVICE — ELECTRODE REM PLYHSV RETURN 9

## (undated) DEVICE — SET DECANTER MEDICHOICE

## (undated) DEVICE — DRAPE PLASTIC U 60X72

## (undated) DEVICE — SUT 3-0 12-18IN SILK

## (undated) DEVICE — COVERS PROBE NR-48 STERILE

## (undated) DEVICE — SEE MEDLINE ITEM 157173

## (undated) DEVICE — DRESSING TRANS 2X2 TEGADERM

## (undated) DEVICE — GOWN SMART IMP BREATHABLE XXLG